# Patient Record
Sex: FEMALE | Race: WHITE | NOT HISPANIC OR LATINO
[De-identification: names, ages, dates, MRNs, and addresses within clinical notes are randomized per-mention and may not be internally consistent; named-entity substitution may affect disease eponyms.]

---

## 2017-02-15 ENCOUNTER — OTHER (OUTPATIENT)
Age: 37
End: 2017-02-15

## 2017-02-16 ENCOUNTER — APPOINTMENT (OUTPATIENT)
Dept: OBGYN | Facility: CLINIC | Age: 37
End: 2017-02-16

## 2017-03-07 ENCOUNTER — APPOINTMENT (OUTPATIENT)
Dept: INTERNAL MEDICINE | Facility: CLINIC | Age: 37
End: 2017-03-07

## 2017-03-17 ENCOUNTER — APPOINTMENT (OUTPATIENT)
Dept: OBGYN | Facility: CLINIC | Age: 37
End: 2017-03-17

## 2017-07-20 ENCOUNTER — RESULT REVIEW (OUTPATIENT)
Age: 37
End: 2017-07-20

## 2018-04-10 ENCOUNTER — APPOINTMENT (OUTPATIENT)
Dept: ULTRASOUND IMAGING | Facility: CLINIC | Age: 38
End: 2018-04-10

## 2018-05-07 ENCOUNTER — APPOINTMENT (OUTPATIENT)
Dept: ULTRASOUND IMAGING | Facility: CLINIC | Age: 38
End: 2018-05-07
Payer: COMMERCIAL

## 2018-05-07 ENCOUNTER — OUTPATIENT (OUTPATIENT)
Dept: OUTPATIENT SERVICES | Facility: HOSPITAL | Age: 38
LOS: 1 days | End: 2018-05-07
Payer: COMMERCIAL

## 2018-05-07 DIAGNOSIS — Z00.8 ENCOUNTER FOR OTHER GENERAL EXAMINATION: ICD-10-CM

## 2018-05-07 PROCEDURE — 76641 ULTRASOUND BREAST COMPLETE: CPT | Mod: 26,50

## 2018-05-07 PROCEDURE — 76641 ULTRASOUND BREAST COMPLETE: CPT

## 2019-11-06 ENCOUNTER — TRANSCRIPTION ENCOUNTER (OUTPATIENT)
Age: 39
End: 2019-11-06

## 2019-11-10 ENCOUNTER — EMERGENCY (EMERGENCY)
Facility: HOSPITAL | Age: 39
LOS: 1 days | Discharge: HOME | End: 2019-11-10
Attending: EMERGENCY MEDICINE | Admitting: EMERGENCY MEDICINE
Payer: COMMERCIAL

## 2019-11-10 VITALS
RESPIRATION RATE: 18 BRPM | SYSTOLIC BLOOD PRESSURE: 126 MMHG | TEMPERATURE: 98 F | OXYGEN SATURATION: 98 % | HEIGHT: 61 IN | HEART RATE: 112 BPM | DIASTOLIC BLOOD PRESSURE: 87 MMHG | WEIGHT: 115.08 LBS

## 2019-11-10 VITALS — DIASTOLIC BLOOD PRESSURE: 70 MMHG | TEMPERATURE: 98 F | SYSTOLIC BLOOD PRESSURE: 122 MMHG | HEART RATE: 82 BPM

## 2019-11-10 LAB
ALBUMIN SERPL ELPH-MCNC: 3.7 G/DL — SIGNIFICANT CHANGE UP (ref 3.3–5)
ALP SERPL-CCNC: 44 U/L — SIGNIFICANT CHANGE UP (ref 30–120)
ALT FLD-CCNC: 21 U/L DA — SIGNIFICANT CHANGE UP (ref 10–60)
ANION GAP SERPL CALC-SCNC: 7 MMOL/L — SIGNIFICANT CHANGE UP (ref 5–17)
APPEARANCE UR: ABNORMAL
AST SERPL-CCNC: 14 U/L — SIGNIFICANT CHANGE UP (ref 10–40)
BASOPHILS # BLD AUTO: 0 K/UL — SIGNIFICANT CHANGE UP (ref 0–0.2)
BASOPHILS NFR BLD AUTO: 0 % — SIGNIFICANT CHANGE UP (ref 0–2)
BILIRUB SERPL-MCNC: 0.3 MG/DL — SIGNIFICANT CHANGE UP (ref 0.2–1.2)
BILIRUB UR-MCNC: NEGATIVE — SIGNIFICANT CHANGE UP
BUN SERPL-MCNC: 6 MG/DL — LOW (ref 7–23)
CALCIUM SERPL-MCNC: 9.8 MG/DL — SIGNIFICANT CHANGE UP (ref 8.4–10.5)
CHLORIDE SERPL-SCNC: 101 MMOL/L — SIGNIFICANT CHANGE UP (ref 96–108)
CO2 SERPL-SCNC: 28 MMOL/L — SIGNIFICANT CHANGE UP (ref 22–31)
COLOR SPEC: SIGNIFICANT CHANGE UP
CREAT SERPL-MCNC: 0.76 MG/DL — SIGNIFICANT CHANGE UP (ref 0.5–1.3)
DIFF PNL FLD: ABNORMAL
EOSINOPHIL # BLD AUTO: 0.03 K/UL — SIGNIFICANT CHANGE UP (ref 0–0.5)
EOSINOPHIL NFR BLD AUTO: 0.7 % — SIGNIFICANT CHANGE UP (ref 0–6)
GLUCOSE SERPL-MCNC: 158 MG/DL — HIGH (ref 70–99)
GLUCOSE UR QL: NEGATIVE MG/DL — SIGNIFICANT CHANGE UP
HCG SERPL-ACNC: 1 MIU/ML — SIGNIFICANT CHANGE UP
HCT VFR BLD CALC: 41.2 % — SIGNIFICANT CHANGE UP (ref 34.5–45)
HGB BLD-MCNC: 13.7 G/DL — SIGNIFICANT CHANGE UP (ref 11.5–15.5)
IMM GRANULOCYTES NFR BLD AUTO: 0.5 % — SIGNIFICANT CHANGE UP (ref 0–1.5)
KETONES UR-MCNC: NEGATIVE — SIGNIFICANT CHANGE UP
LEUKOCYTE ESTERASE UR-ACNC: NEGATIVE — SIGNIFICANT CHANGE UP
LIDOCAIN IGE QN: 110 U/L — SIGNIFICANT CHANGE UP (ref 73–393)
LYMPHOCYTES # BLD AUTO: 0.58 K/UL — LOW (ref 1–3.3)
LYMPHOCYTES # BLD AUTO: 14 % — SIGNIFICANT CHANGE UP (ref 13–44)
MCHC RBC-ENTMCNC: 31 PG — SIGNIFICANT CHANGE UP (ref 27–34)
MCHC RBC-ENTMCNC: 33.3 GM/DL — SIGNIFICANT CHANGE UP (ref 32–36)
MCV RBC AUTO: 93.2 FL — SIGNIFICANT CHANGE UP (ref 80–100)
MONOCYTES # BLD AUTO: 0.25 K/UL — SIGNIFICANT CHANGE UP (ref 0–0.9)
MONOCYTES NFR BLD AUTO: 6 % — SIGNIFICANT CHANGE UP (ref 2–14)
NEUTROPHILS # BLD AUTO: 3.27 K/UL — SIGNIFICANT CHANGE UP (ref 1.8–7.4)
NEUTROPHILS NFR BLD AUTO: 78.8 % — HIGH (ref 43–77)
NITRITE UR-MCNC: NEGATIVE — SIGNIFICANT CHANGE UP
NRBC # BLD: 0 /100 WBCS — SIGNIFICANT CHANGE UP (ref 0–0)
PH UR: 7 — SIGNIFICANT CHANGE UP (ref 5–8)
PLATELET # BLD AUTO: 178 K/UL — SIGNIFICANT CHANGE UP (ref 150–400)
POTASSIUM SERPL-MCNC: 3.5 MMOL/L — SIGNIFICANT CHANGE UP (ref 3.5–5.3)
POTASSIUM SERPL-SCNC: 3.5 MMOL/L — SIGNIFICANT CHANGE UP (ref 3.5–5.3)
PROT SERPL-MCNC: 7.8 G/DL — SIGNIFICANT CHANGE UP (ref 6–8.3)
PROT UR-MCNC: NEGATIVE MG/DL — SIGNIFICANT CHANGE UP
RBC # BLD: 4.42 M/UL — SIGNIFICANT CHANGE UP (ref 3.8–5.2)
RBC # FLD: 11.8 % — SIGNIFICANT CHANGE UP (ref 10.3–14.5)
SODIUM SERPL-SCNC: 136 MMOL/L — SIGNIFICANT CHANGE UP (ref 135–145)
SP GR SPEC: 1.01 — SIGNIFICANT CHANGE UP (ref 1.01–1.02)
UROBILINOGEN FLD QL: NEGATIVE MG/DL — SIGNIFICANT CHANGE UP
WBC # BLD: 4.15 K/UL — SIGNIFICANT CHANGE UP (ref 3.8–10.5)
WBC # FLD AUTO: 4.15 K/UL — SIGNIFICANT CHANGE UP (ref 3.8–10.5)

## 2019-11-10 PROCEDURE — 36415 COLL VENOUS BLD VENIPUNCTURE: CPT

## 2019-11-10 PROCEDURE — 84702 CHORIONIC GONADOTROPIN TEST: CPT

## 2019-11-10 PROCEDURE — 71046 X-RAY EXAM CHEST 2 VIEWS: CPT

## 2019-11-10 PROCEDURE — 96361 HYDRATE IV INFUSION ADD-ON: CPT

## 2019-11-10 PROCEDURE — 99284 EMERGENCY DEPT VISIT MOD MDM: CPT | Mod: 25

## 2019-11-10 PROCEDURE — 83690 ASSAY OF LIPASE: CPT

## 2019-11-10 PROCEDURE — 87086 URINE CULTURE/COLONY COUNT: CPT

## 2019-11-10 PROCEDURE — 85027 COMPLETE CBC AUTOMATED: CPT

## 2019-11-10 PROCEDURE — 99284 EMERGENCY DEPT VISIT MOD MDM: CPT

## 2019-11-10 PROCEDURE — 71046 X-RAY EXAM CHEST 2 VIEWS: CPT | Mod: 26

## 2019-11-10 PROCEDURE — 96360 HYDRATION IV INFUSION INIT: CPT

## 2019-11-10 PROCEDURE — 80053 COMPREHEN METABOLIC PANEL: CPT

## 2019-11-10 PROCEDURE — 81001 URINALYSIS AUTO W/SCOPE: CPT

## 2019-11-10 RX ORDER — CLARITHROMYCIN 500 MG
0 TABLET ORAL
Qty: 0 | Refills: 0 | DISCHARGE

## 2019-11-10 RX ORDER — ASCORBIC ACID 60 MG
1 TABLET,CHEWABLE ORAL
Qty: 0 | Refills: 0 | DISCHARGE

## 2019-11-10 RX ORDER — GUAIFENESIN/PSEUDOEPHEDRNE HCL 1200-120MG
1 TABLET, EXTENDED RELEASE 12 HR ORAL
Qty: 10 | Refills: 0
Start: 2019-11-10 | End: 2019-11-14

## 2019-11-10 RX ORDER — SODIUM CHLORIDE 9 MG/ML
1000 INJECTION INTRAMUSCULAR; INTRAVENOUS; SUBCUTANEOUS ONCE
Refills: 0 | Status: COMPLETED | OUTPATIENT
Start: 2019-11-10 | End: 2019-11-10

## 2019-11-10 RX ORDER — L.ACIDOPH/B.ANIMALIS/B.LONGUM 15B CELL
1 CAPSULE ORAL
Qty: 0 | Refills: 0 | DISCHARGE

## 2019-11-10 RX ORDER — SODIUM CHLORIDE 9 MG/ML
3 INJECTION INTRAMUSCULAR; INTRAVENOUS; SUBCUTANEOUS ONCE
Refills: 0 | Status: COMPLETED | OUTPATIENT
Start: 2019-11-10 | End: 2019-11-10

## 2019-11-10 RX ADMIN — SODIUM CHLORIDE 1000 MILLILITER(S): 9 INJECTION INTRAMUSCULAR; INTRAVENOUS; SUBCUTANEOUS at 11:35

## 2019-11-10 RX ADMIN — SODIUM CHLORIDE 1000 MILLILITER(S): 9 INJECTION INTRAMUSCULAR; INTRAVENOUS; SUBCUTANEOUS at 10:20

## 2019-11-10 RX ADMIN — SODIUM CHLORIDE 3 MILLILITER(S): 9 INJECTION INTRAMUSCULAR; INTRAVENOUS; SUBCUTANEOUS at 09:20

## 2019-11-10 RX ADMIN — SODIUM CHLORIDE 1000 MILLILITER(S): 9 INJECTION INTRAMUSCULAR; INTRAVENOUS; SUBCUTANEOUS at 09:20

## 2019-11-10 RX ADMIN — SODIUM CHLORIDE 1000 MILLILITER(S): 9 INJECTION INTRAMUSCULAR; INTRAVENOUS; SUBCUTANEOUS at 10:30

## 2019-11-10 NOTE — ED PROVIDER NOTE - NSFOLLOWUPINSTRUCTIONS_ED_ALL_ED_FT
Return to the ED for any new or worsening symptoms  Take your medication as prescribed  Stop the Z-Pack   Begin Augmentin 1 tab 2 times a day   Begin Mucinex per label instructions   Advance activity as tolerated  Follow up with your PMD in 2-3 days for a recheck   Tylenol and Motrin per label instructions as needed for pan and fever   Make sure to remain hydrated

## 2019-11-10 NOTE — ED ADULT NURSE NOTE - CHPI ED NUR SYMPTOMS NEG
no chest pain/no wheezing/no body aches/no diaphoresis/no chills/no headache/no shortness of breath/no edema/no hemoptysis

## 2019-11-10 NOTE — ED ADULT TRIAGE NOTE - CHIEF COMPLAINT QUOTE
" I have Pneumonia, has been on ZPack since Thursday prescribed by Urgent Care, I'm not feeling better "

## 2019-11-10 NOTE — ED PROVIDER NOTE - PATIENT PORTAL LINK FT
You can access the FollowMyHealth Patient Portal offered by [facility] by registering at the following website: [email or web address]. By joining [employer]’s FollowMyHealth portal, you will also be able to view your health information using other applications (apps) compatible with our system.

## 2019-11-10 NOTE — ED PROVIDER NOTE - PROGRESS NOTE DETAILS
pt resting comfortably improved after hydration.  Results provided, all questions answered.  Stable for discharge home with outpatient follow up

## 2019-11-10 NOTE — ED PROVIDER NOTE - CLINICAL SUMMARY MEDICAL DECISION MAKING FREE TEXT BOX
Pt is a 39 yo female who presents to the ED with a cc of cough.  Pt with no significant past medical history.  Reports that on Wednesday she developed fevers with associated chills, diffuse myalgias and a non productive cough.  She was seen at urgent care on Thursday and was diagnosed with pneumonia.  Pt was treated with Zithromax and today would be day 4 of treatment.  She further reports that she had a rapid flu done at that time which was negative.  She reports that despite taking the medication she has had continued fevers, chills, and has now developed some mild nausea.  She spoke with her PMD and was told to come to the ED for further work up.  Denies HA, visual changes, nasal congestion, V/D/C, CP, SOB, abd pain.  Pt reports that when she takes Tylenol her fevers do resolve but they again return.  Pt last took Tylenol this am prior to arrival.  Pt with concern for untreated pneumonia vs viral illness.  Will obtain screening labs, chest x-ray and hydrate.  Will monitor

## 2019-11-10 NOTE — ED PROVIDER NOTE - OBJECTIVE STATEMENT
Pt is a 37 yo female who presents to the ED with a cc of cough.  Pt with no significant past medical history.  Reports that on Wednesday she developed fevers with associated chills, diffuse myalgias and a non productive cough.  She was seen at urgent care on Thursday and was diagnosed with pneumonia.  Pt was treated with Zithromax and today would be day 4 of treatment.  She further reports that she had a rapid flu done at that time which was negative.  She reports that despite taking the medication she has had continued fevers, chills, and has now developed some mild nausea.  She spoke with her PMD and was told to come to the ED for further work up.  Denies HA, visual changes, nasal congestion, V/D/C, CP, SOB, abd pain.  Pt reports that when she takes Tylenol her fevers do resolve but they again return.  Pt last took Tylenol this am prior to arrival

## 2019-11-10 NOTE — ED PROVIDER NOTE - NEUROLOGICAL, MLM
Alert and oriented, no focal deficits, no motor or sensory deficits. FROM of neck with no meningeal signs

## 2019-11-11 LAB
CULTURE RESULTS: SIGNIFICANT CHANGE UP
SPECIMEN SOURCE: SIGNIFICANT CHANGE UP

## 2019-11-15 ENCOUNTER — APPOINTMENT (OUTPATIENT)
Dept: UROLOGY | Facility: CLINIC | Age: 39
End: 2019-11-15

## 2019-12-17 PROBLEM — J18.9 PNEUMONIA, UNSPECIFIED ORGANISM: Chronic | Status: ACTIVE | Noted: 2019-11-10

## 2019-12-19 ENCOUNTER — NON-APPOINTMENT (OUTPATIENT)
Age: 39
End: 2019-12-19

## 2019-12-19 ENCOUNTER — APPOINTMENT (OUTPATIENT)
Dept: OPHTHALMOLOGY | Facility: CLINIC | Age: 39
End: 2019-12-19
Payer: COMMERCIAL

## 2019-12-19 PROCEDURE — 92250 FUNDUS PHOTOGRAPHY W/I&R: CPT

## 2019-12-19 PROCEDURE — 92004 COMPRE OPH EXAM NEW PT 1/>: CPT

## 2019-12-26 ENCOUNTER — APPOINTMENT (OUTPATIENT)
Dept: CT IMAGING | Facility: CLINIC | Age: 39
End: 2019-12-26
Payer: COMMERCIAL

## 2019-12-26 ENCOUNTER — OUTPATIENT (OUTPATIENT)
Dept: OUTPATIENT SERVICES | Facility: HOSPITAL | Age: 39
LOS: 1 days | End: 2019-12-26
Payer: COMMERCIAL

## 2019-12-26 DIAGNOSIS — Z00.8 ENCOUNTER FOR OTHER GENERAL EXAMINATION: ICD-10-CM

## 2019-12-26 PROCEDURE — 71275 CT ANGIOGRAPHY CHEST: CPT | Mod: 26

## 2019-12-26 PROCEDURE — 71275 CT ANGIOGRAPHY CHEST: CPT

## 2020-01-13 ENCOUNTER — APPOINTMENT (OUTPATIENT)
Dept: UROLOGY | Facility: CLINIC | Age: 40
End: 2020-01-13
Payer: COMMERCIAL

## 2020-01-13 VITALS
DIASTOLIC BLOOD PRESSURE: 84 MMHG | HEART RATE: 92 BPM | BODY MASS INDEX: 21.71 KG/M2 | RESPIRATION RATE: 18 BRPM | SYSTOLIC BLOOD PRESSURE: 120 MMHG | WEIGHT: 115 LBS | HEIGHT: 61 IN

## 2020-01-13 PROCEDURE — 99204 OFFICE O/P NEW MOD 45 MIN: CPT

## 2020-01-13 NOTE — PHYSICAL EXAM
[General Appearance - In No Acute Distress] : no acute distress [General Appearance - Well Nourished] : well nourished [General Appearance - Well Developed] : well developed [Abdomen Soft] : soft [Exaggerated Use Of Accessory Muscles For Inspiration] : no accessory muscle use [Edema] : no peripheral edema [Abdomen Tenderness] : non-tender [Normal Station and Gait] : the gait and station were normal for the patient's age [Costovertebral Angle Tenderness] : no ~M costovertebral angle tenderness [No Focal Deficits] : no focal deficits [Skin Color & Pigmentation] : normal skin color and pigmentation [Supraclavicular Lymph Nodes Enlarged Bilaterally] : supraclavicular [Oriented To Time, Place, And Person] : oriented to person, place, and time [Cervical Lymph Nodes Enlarged Anterior Bilaterally] : anterior cervical

## 2020-01-13 NOTE — HISTORY OF PRESENT ILLNESS
[FreeTextEntry1] : 39 year old F with cc of recurrent UTI. Pt reports that she has had issues since becoming sexually active with recurrent infection. Had 6 infections last year. Does correlate with intercourse usually. Sx during an infection include dysuria and increased frequency. Appears to be culture proven. No febrile infections. Last on abx completed 3-4 week ago. Had seen  in the past. Had been on macrobid ppx in the past. \par \par Drinks water and tea or coffee. No holding pattern. At baseline, no urinary complaints. No issues with constipation. No dyspareunia. Has hx of stones x1 in her early 20s.  No recent imaging.

## 2020-01-13 NOTE — ASSESSMENT
[FreeTextEntry1] : --UA, UCx to ensure clearance\par --Cont encourage fluid intake and frequent voiding\par --Void after intercourse\par --Anatomic eval with renal US and cysto\par --Begin ppx with methenamine\par --Begin vit C\par --RTC for cysto

## 2020-01-16 LAB
APPEARANCE: CLEAR
BACTERIA UR CULT: ABNORMAL
BACTERIA: NEGATIVE
BILIRUBIN URINE: NEGATIVE
BLOOD URINE: NEGATIVE
COLOR: NORMAL
GLUCOSE QUALITATIVE U: NEGATIVE
HYALINE CASTS: 1 /LPF
KETONES URINE: NEGATIVE
LEUKOCYTE ESTERASE URINE: NEGATIVE
MICROSCOPIC-UA: NORMAL
NITRITE URINE: NEGATIVE
PH URINE: 6
PROTEIN URINE: NEGATIVE
RED BLOOD CELLS URINE: 4 /HPF
SPECIFIC GRAVITY URINE: 1.02
SQUAMOUS EPITHELIAL CELLS: 2 /HPF
UROBILINOGEN URINE: NORMAL
WHITE BLOOD CELLS URINE: 2 /HPF

## 2020-01-27 ENCOUNTER — TRANSCRIPTION ENCOUNTER (OUTPATIENT)
Age: 40
End: 2020-01-27

## 2020-01-27 ENCOUNTER — APPOINTMENT (OUTPATIENT)
Dept: OPHTHALMOLOGY | Facility: CLINIC | Age: 40
End: 2020-01-27

## 2020-01-29 ENCOUNTER — OUTPATIENT (OUTPATIENT)
Dept: OUTPATIENT SERVICES | Facility: HOSPITAL | Age: 40
LOS: 1 days | End: 2020-01-29
Payer: COMMERCIAL

## 2020-01-29 ENCOUNTER — APPOINTMENT (OUTPATIENT)
Dept: UROLOGY | Facility: CLINIC | Age: 40
End: 2020-01-29
Payer: COMMERCIAL

## 2020-01-29 VITALS
BODY MASS INDEX: 21.71 KG/M2 | HEIGHT: 61 IN | HEART RATE: 96 BPM | TEMPERATURE: 98.6 F | DIASTOLIC BLOOD PRESSURE: 85 MMHG | WEIGHT: 115 LBS | SYSTOLIC BLOOD PRESSURE: 132 MMHG

## 2020-01-29 DIAGNOSIS — R35.0 FREQUENCY OF MICTURITION: ICD-10-CM

## 2020-01-29 PROCEDURE — 52000 CYSTOURETHROSCOPY: CPT

## 2020-01-29 PROCEDURE — 99214 OFFICE O/P EST MOD 30 MIN: CPT | Mod: 25

## 2020-01-29 PROCEDURE — 76775 US EXAM ABDO BACK WALL LIM: CPT | Mod: 26

## 2020-01-29 PROCEDURE — 76775 US EXAM ABDO BACK WALL LIM: CPT

## 2020-01-30 NOTE — ASSESSMENT
[FreeTextEntry1] : Recurrent UTI\par --UA, UCx to ensure clearance\par --Cont encourage fluid intake and frequent voiding\par --Void after intercourse\par --Begin ppx with methenamine\par --Begin vit C (500mg BID)\par \par Stones\par --Increase fluid intake\par --Decrease caffeine\par --Renal US eval in 1y

## 2020-01-30 NOTE — PHYSICAL EXAM
[General Appearance - Well Developed] : well developed [General Appearance - Well Nourished] : well nourished [General Appearance - In No Acute Distress] : no acute distress [Abdomen Soft] : soft [Abdomen Tenderness] : non-tender [Costovertebral Angle Tenderness] : no ~M costovertebral angle tenderness [Skin Color & Pigmentation] : normal skin color and pigmentation [Edema] : no peripheral edema [Exaggerated Use Of Accessory Muscles For Inspiration] : no accessory muscle use [Oriented To Time, Place, And Person] : oriented to person, place, and time [Normal Station and Gait] : the gait and station were normal for the patient's age [No Focal Deficits] : no focal deficits [Urethral Meatus] : normal urethra [External Female Genitalia] : normal external genitalia [Urinary Bladder Findings] : the bladder was normal on palpation [Vagina] : normal vaginal exam

## 2020-01-30 NOTE — HISTORY OF PRESENT ILLNESS
[FreeTextEntry1] : 39 year old F with cc of recurrent UTI. Pt reports that she has had issues since becoming sexually active with recurrent infection. Had 6 infections last year. Does correlate with intercourse usually. Sx during an infection include dysuria and increased frequency. Appears to be culture proven. No febrile infections. Had seen  in the past. Had been on macrobid ppx in the past. She was started on methenamine but has not started taking. UA from initial visit was negative but UCx positive and given recent tx, plan made to treat to try and clear prior to initiating methenamine. While on abx with bactrim, pt with low grade fever and WBC low (1.8). Went to ER for eval. Suspected to be viral vs drug related. WBC now improving. \par \par Plan also made for anatomic eval. Cysto today negative. Renal US showed probable 3mm LLP stone. Calcification on R appears intraparenchymal. Pt reports hx of stones in the past x1 in her early 20s. Drinks water and tea or coffee. No holding pattern. At baseline, no urinary complaints. No issues with constipation. No dyspareunia.

## 2020-02-03 LAB
APPEARANCE: CLEAR
BACTERIA UR CULT: NORMAL
BACTERIA: NEGATIVE
BILIRUBIN URINE: NEGATIVE
BLOOD URINE: ABNORMAL
COLOR: COLORLESS
GLUCOSE QUALITATIVE U: NEGATIVE
HYALINE CASTS: 0 /LPF
KETONES URINE: NEGATIVE
LEUKOCYTE ESTERASE URINE: NEGATIVE
MICROSCOPIC-UA: NORMAL
NITRITE URINE: NEGATIVE
PH URINE: 7
PROTEIN URINE: NEGATIVE
RED BLOOD CELLS URINE: 1 /HPF
SPECIFIC GRAVITY URINE: 1
SQUAMOUS EPITHELIAL CELLS: 1 /HPF
UROBILINOGEN URINE: NORMAL
WHITE BLOOD CELLS URINE: 0 /HPF

## 2020-02-10 ENCOUNTER — RX CHANGE (OUTPATIENT)
Age: 40
End: 2020-02-10

## 2020-02-10 DIAGNOSIS — N20.0 CALCULUS OF KIDNEY: ICD-10-CM

## 2020-02-10 DIAGNOSIS — N39.0 URINARY TRACT INFECTION, SITE NOT SPECIFIED: ICD-10-CM

## 2020-02-13 RX ORDER — SULFAMETHOXAZOLE AND TRIMETHOPRIM 800; 160 MG/1; MG/1
800-160 TABLET ORAL
Qty: 14 | Refills: 0 | Status: DISCONTINUED | COMMUNITY
Start: 2020-01-16 | End: 2020-02-13

## 2020-03-02 ENCOUNTER — APPOINTMENT (OUTPATIENT)
Dept: ULTRASOUND IMAGING | Facility: IMAGING CENTER | Age: 40
End: 2020-03-02

## 2020-03-02 ENCOUNTER — APPOINTMENT (OUTPATIENT)
Dept: UROLOGY | Facility: CLINIC | Age: 40
End: 2020-03-02

## 2020-06-01 ENCOUNTER — APPOINTMENT (OUTPATIENT)
Dept: OPHTHALMOLOGY | Facility: CLINIC | Age: 40
End: 2020-06-01
Payer: COMMERCIAL

## 2020-06-01 ENCOUNTER — NON-APPOINTMENT (OUTPATIENT)
Age: 40
End: 2020-06-01

## 2020-06-01 PROCEDURE — 92012 INTRM OPH EXAM EST PATIENT: CPT

## 2020-06-01 PROCEDURE — 92133 CPTRZD OPH DX IMG PST SGM ON: CPT

## 2020-06-09 ENCOUNTER — APPOINTMENT (OUTPATIENT)
Dept: UROLOGY | Facility: CLINIC | Age: 40
End: 2020-06-09
Payer: COMMERCIAL

## 2020-06-09 PROCEDURE — 99213 OFFICE O/P EST LOW 20 MIN: CPT | Mod: 95

## 2020-06-09 NOTE — ASSESSMENT
[FreeTextEntry1] : Recurrent UTI. Has done very well with methenamine with no infections. She is anxious about stopping the medication. \par --Cont encourage fluid intake and frequent voiding\par --Void after intercourse\par --Cont methenamine but decrease to daily\par --COnt vit C (500mg now qd)\par \par Stones\par --Increase fluid intake\par --Decrease caffeine\par --Renal US eval in 6mo

## 2020-06-09 NOTE — PHYSICAL EXAM
[General Appearance - Well Developed] : well developed [General Appearance - Well Nourished] : well nourished [General Appearance - In No Acute Distress] : no acute distress [Abdomen Tenderness] : non-tender [Oriented To Time, Place, And Person] : oriented to person, place, and time [Normal Station and Gait] : the gait and station were normal for the patient's age

## 2020-06-09 NOTE — HISTORY OF PRESENT ILLNESS
[Other Location: e.g. Home (Enter Location, City,State)___] : at [unfilled] [FreeTextEntry1] : The patient-doctor relationship has been established in a face to face fashion via real time video/audio HIPAA compliant communication using telemedicine software. The patient's identity has been confirmed. The patient was previously emailed a copy of the telemedicine consent. They have had a chance to review and has now given verbal consent and has requested care to be assessed and treated through telemedicine and understands there maybe limitations in this process and they may need further follow up care in the office and or hospital settings.\par \par Verbal consent given to me on 06/09/2020 at 10:59 AM by Ms. ILDA NEALGONZALO [place] [address]. \par \par 39 year old F with cc of recurrent UTI. Pt reports that she has had issues since becoming sexually active with recurrent infection. Does correlate with intercourse usually. Sx during an infection include dysuria and increased frequency. Appears to be culture proven. No febrile infections. Had seen  in the past. Had been on macrobid ppx in the past. She was started on methenamine. Had anatomic eval 1/2020 with negative cysto and renal US showed probable 3mm LLP stone. Calcification on R appears intraparenchymal. Pt reports hx of stones in the past x1 in her early 20s. Drinks water and tea or coffee. No holding pattern. At baseline, no urinary complaints. No issues with constipation. No dyspareunia. \par \par She is seen today for follow-up. No infections since starting medications in Jan. She is extremely happy with this--longest she has gone without infection. She is anxious about stopping it. She denies issues with stones.

## 2020-08-10 ENCOUNTER — APPOINTMENT (OUTPATIENT)
Dept: PEDIATRIC ALLERGY IMMUNOLOGY | Facility: CLINIC | Age: 40
End: 2020-08-10

## 2020-08-27 ENCOUNTER — APPOINTMENT (OUTPATIENT)
Dept: UROLOGY | Facility: CLINIC | Age: 40
End: 2020-08-27
Payer: COMMERCIAL

## 2020-08-27 PROCEDURE — 99213 OFFICE O/P EST LOW 20 MIN: CPT

## 2020-08-27 NOTE — ASSESSMENT
[FreeTextEntry1] : Recurrent UTI. Has done very well with methenamine with no infections. She is anxious about stopping the medication. \par --Cont encourage fluid intake and frequent voiding\par --Void after intercourse\par --Cont methenamine at daily\par --COnt vit C (500mg now qd)\par --Complete keflex (change to TID)\par --Pyridium prn\par \par Stones\par --Increase fluid intake\par --Decrease caffeine\par --Renal US eval in [place]

## 2020-08-27 NOTE — PHYSICAL EXAM
[General Appearance - Well Developed] : well developed [General Appearance - Well Nourished] : well nourished [Abdomen Tenderness] : non-tender [Edema] : no peripheral edema [General Appearance - In No Acute Distress] : no acute distress [Oriented To Time, Place, And Person] : oriented to person, place, and time [Normal Station and Gait] : the gait and station were normal for the patient's age [Exaggerated Use Of Accessory Muscles For Inspiration] : no accessory muscle use [No Focal Deficits] : no focal deficits

## 2020-08-27 NOTE — HISTORY OF PRESENT ILLNESS
[FreeTextEntry1] : 39 year old F with cc of recurrent UTI. Pt reports that she has had issues since becoming sexually active with recurrent infection. Does correlate with intercourse usually. Sx during an infection include dysuria and increased frequency. Appears to be culture proven. No febrile infections. Had seen  in the past. Had been on macrobid ppx in the past. She was started on methenamine. Had anatomic eval 1/2020 with negative cysto and renal US showed probable 3mm LLP stone. Calcification on R appears intraparenchymal. Pt reports hx of stones in the past x1 in her early 20s. Drinks water and tea or coffee. No holding pattern. At baseline, no urinary complaints. No issues with constipation. No dyspareunia. \par \par She is seen today for follow-up. No infections since starting medications in Jan. She is extremely happy with this--longest she has gone without infection. She was anxious about stopping it and we decreased to daily. She denies issues with stones. Last weekend she developed acute change in urinary sx and went to urgent care. UCx with pansesnitive EColi. She is still taking kefelx and feeling better.

## 2020-12-08 ENCOUNTER — NON-APPOINTMENT (OUTPATIENT)
Age: 40
End: 2020-12-08

## 2020-12-08 ENCOUNTER — APPOINTMENT (OUTPATIENT)
Dept: OPHTHALMOLOGY | Facility: CLINIC | Age: 40
End: 2020-12-08
Payer: COMMERCIAL

## 2020-12-08 PROCEDURE — 99072 ADDL SUPL MATRL&STAF TM PHE: CPT

## 2020-12-08 PROCEDURE — 92014 COMPRE OPH EXAM EST PT 1/>: CPT

## 2020-12-08 PROCEDURE — 92083 EXTENDED VISUAL FIELD XM: CPT

## 2020-12-08 PROCEDURE — 76514 ECHO EXAM OF EYE THICKNESS: CPT

## 2021-02-04 ENCOUNTER — APPOINTMENT (OUTPATIENT)
Dept: UROLOGY | Facility: CLINIC | Age: 41
End: 2021-02-04
Payer: COMMERCIAL

## 2021-02-04 VITALS
BODY MASS INDEX: 21.71 KG/M2 | SYSTOLIC BLOOD PRESSURE: 131 MMHG | RESPIRATION RATE: 16 BRPM | HEIGHT: 61 IN | TEMPERATURE: 98.3 F | WEIGHT: 115 LBS | DIASTOLIC BLOOD PRESSURE: 83 MMHG | HEART RATE: 104 BPM | OXYGEN SATURATION: 98 %

## 2021-02-04 DIAGNOSIS — N20.0 CALCULUS OF KIDNEY: ICD-10-CM

## 2021-02-04 PROCEDURE — 99072 ADDL SUPL MATRL&STAF TM PHE: CPT

## 2021-02-04 PROCEDURE — 99213 OFFICE O/P EST LOW 20 MIN: CPT

## 2021-02-04 NOTE — ASSESSMENT
[FreeTextEntry1] : Recurrent UTI. Has done much better from infection standpoint. \par --Increase fluid intake. Goal of >1.5L of water per day\par --Cont methenamine at BID dosing\par --COnt vit C \par --Pyridium x3-4d for current sx while awaiting cx\par \par Stones\par --Increase fluid intake\par --Renal US eval in [place]

## 2021-02-04 NOTE — HISTORY OF PRESENT ILLNESS
[FreeTextEntry1] : 40 year old F with cc of recurrent UTI. Pt reports that she has had issues since becoming sexually active with recurrent infection. Does correlate with intercourse usually. Sx during an infection include dysuria and increased frequency. Appears to be culture proven. No febrile infections. Had seen  in the past. Had been on macrobid ppx in the past. She was started on methenamine. Had anatomic eval 1/2020 with negative cysto and renal US showed probable 3mm LLP stone. Calcification on R appears intraparenchymal. Pt reports hx of stones in the past x1 in her early 20s. Drinks water and tea or coffee. No holding pattern. At baseline, no urinary complaints. No issues with constipation. No dyspareunia. Pt last seen 6mo ago and had no infection and was decreased to daily. She had one infection on this regimen with a pansensitive EColi. \par \par She returns today for follow-up. She reports that she had sx of UTI last week. SHe took abx on her own for this with keflex. She is still feeling a little bother. She has been taking the BID dosing. She has had some issues with yeast infections at the time of the abx use. \par

## 2021-02-04 NOTE — PHYSICAL EXAM
[General Appearance - Well Developed] : well developed [General Appearance - Well Nourished] : well nourished [General Appearance - In No Acute Distress] : no acute distress [Abdomen Tenderness] : non-tender [Edema] : no peripheral edema [Exaggerated Use Of Accessory Muscles For Inspiration] : no accessory muscle use [Oriented To Time, Place, And Person] : oriented to person, place, and time [Normal Station and Gait] : the gait and station were normal for the patient's age [No Focal Deficits] : no focal deficits [Abdomen Soft] : soft [Costovertebral Angle Tenderness] : no ~M costovertebral angle tenderness

## 2021-02-08 LAB
APPEARANCE: CLEAR
BACTERIA UR CULT: NORMAL
BACTERIA: NEGATIVE
BILIRUBIN URINE: NEGATIVE
BLOOD URINE: NEGATIVE
COLOR: COLORLESS
GLUCOSE QUALITATIVE U: NEGATIVE
HYALINE CASTS: 0 /LPF
KETONES URINE: NEGATIVE
LEUKOCYTE ESTERASE URINE: NEGATIVE
MICROSCOPIC-UA: NORMAL
NITRITE URINE: NEGATIVE
PH URINE: 6.5
PROTEIN URINE: NEGATIVE
RED BLOOD CELLS URINE: 0 /HPF
SPECIFIC GRAVITY URINE: 1.01
SQUAMOUS EPITHELIAL CELLS: 0 /HPF
UROBILINOGEN URINE: NORMAL
WHITE BLOOD CELLS URINE: 0 /HPF

## 2021-02-22 ENCOUNTER — APPOINTMENT (OUTPATIENT)
Dept: UROLOGY | Facility: CLINIC | Age: 41
End: 2021-02-22

## 2021-02-23 ENCOUNTER — NON-APPOINTMENT (OUTPATIENT)
Age: 41
End: 2021-02-23

## 2021-02-23 ENCOUNTER — TRANSCRIPTION ENCOUNTER (OUTPATIENT)
Age: 41
End: 2021-02-23

## 2021-03-25 ENCOUNTER — NON-APPOINTMENT (OUTPATIENT)
Age: 41
End: 2021-03-25

## 2021-04-05 ENCOUNTER — APPOINTMENT (OUTPATIENT)
Dept: UROLOGY | Facility: CLINIC | Age: 41
End: 2021-04-05
Payer: COMMERCIAL

## 2021-04-05 ENCOUNTER — OUTPATIENT (OUTPATIENT)
Dept: OUTPATIENT SERVICES | Facility: HOSPITAL | Age: 41
LOS: 1 days | End: 2021-04-05
Payer: COMMERCIAL

## 2021-04-05 DIAGNOSIS — R35.0 FREQUENCY OF MICTURITION: ICD-10-CM

## 2021-04-05 PROCEDURE — 76775 US EXAM ABDO BACK WALL LIM: CPT | Mod: 26

## 2021-04-05 PROCEDURE — 99072 ADDL SUPL MATRL&STAF TM PHE: CPT

## 2021-04-05 PROCEDURE — 76775 US EXAM ABDO BACK WALL LIM: CPT

## 2021-04-05 PROCEDURE — 99213 OFFICE O/P EST LOW 20 MIN: CPT

## 2021-04-05 NOTE — PHYSICAL EXAM
[General Appearance - Well Developed] : well developed [General Appearance - Well Nourished] : well nourished [General Appearance - In No Acute Distress] : no acute distress [Abdomen Soft] : soft [Abdomen Tenderness] : non-tender [Costovertebral Angle Tenderness] : no ~M costovertebral angle tenderness [Edema] : no peripheral edema [Exaggerated Use Of Accessory Muscles For Inspiration] : no accessory muscle use [Oriented To Time, Place, And Person] : oriented to person, place, and time [Normal Station and Gait] : the gait and station were normal for the patient's age [No Focal Deficits] : no focal deficits

## 2021-04-05 NOTE — ASSESSMENT
[FreeTextEntry1] : Recurrent UTI. Has done much better from infection standpoint. \par --Increase fluid intake. Goal of >1.5L of water per day\par --Cont methenamine at BID dosing\par --COnt vit C \par --RTC in 1y\par \par Stones\par --Increase fluid intake\par --Renal US in 1y

## 2021-04-05 NOTE — HISTORY OF PRESENT ILLNESS
[FreeTextEntry1] : 40 year old F with cc of recurrent UTI and stones. Pt reports that she has had issues since becoming sexually active with recurrent infection. Does correlate with intercourse usually. Sx during an infection include dysuria and increased frequency. Appears to be culture proven. No febrile infections. Had seen  in the past. Had been on macrobid ppx in the past. She was started on methenamine. Had anatomic eval 1/2020 with negative cysto and renal US showed probable 3mm LLP stone. Calcification on R appears intraparenchymal. Pt reports hx of stones in the past x1 in her early 20s. Drinks water and tea or coffee. No holding pattern. At baseline, no urinary complaints. No issues with constipation. No dyspareunia. She underwent US today that shows no changes. Unclear if RLP is stone vs calcification. \par \par She returns today for follow-up. Last infection was in Feb with pansensitive EColi. She has been feeling well since. She is anxious about stopping medication as it has been working for her. \par

## 2021-04-09 LAB
APPEARANCE: CLEAR
BACTERIA: NEGATIVE
BILIRUBIN URINE: NEGATIVE
BLOOD URINE: NEGATIVE
COLOR: COLORLESS
GLUCOSE QUALITATIVE U: NEGATIVE
HYALINE CASTS: 0 /LPF
KETONES URINE: NEGATIVE
LEUKOCYTE ESTERASE URINE: NEGATIVE
MICROSCOPIC-UA: NORMAL
NITRITE URINE: NEGATIVE
PH URINE: 6
PROTEIN URINE: NEGATIVE
RED BLOOD CELLS URINE: 1 /HPF
SPECIFIC GRAVITY URINE: 1.01
SQUAMOUS EPITHELIAL CELLS: 2 /HPF
UROBILINOGEN URINE: NORMAL
WHITE BLOOD CELLS URINE: 2 /HPF

## 2021-04-16 DIAGNOSIS — N39.0 URINARY TRACT INFECTION, SITE NOT SPECIFIED: ICD-10-CM

## 2021-04-16 DIAGNOSIS — N20.0 CALCULUS OF KIDNEY: ICD-10-CM

## 2021-10-10 ENCOUNTER — NON-APPOINTMENT (OUTPATIENT)
Age: 41
End: 2021-10-10

## 2021-12-13 ENCOUNTER — APPOINTMENT (OUTPATIENT)
Dept: UROLOGY | Facility: CLINIC | Age: 41
End: 2021-12-13

## 2022-01-11 ENCOUNTER — APPOINTMENT (OUTPATIENT)
Dept: OPHTHALMOLOGY | Facility: CLINIC | Age: 42
End: 2022-01-11
Payer: COMMERCIAL

## 2022-01-11 ENCOUNTER — NON-APPOINTMENT (OUTPATIENT)
Age: 42
End: 2022-01-11

## 2022-01-11 PROCEDURE — 92014 COMPRE OPH EXAM EST PT 1/>: CPT

## 2022-01-11 PROCEDURE — 92133 CPTRZD OPH DX IMG PST SGM ON: CPT

## 2022-01-14 ENCOUNTER — APPOINTMENT (OUTPATIENT)
Dept: UROLOGY | Facility: CLINIC | Age: 42
End: 2022-01-14
Payer: COMMERCIAL

## 2022-01-14 PROCEDURE — 99213 OFFICE O/P EST LOW 20 MIN: CPT

## 2022-01-14 RX ORDER — PHENAZOPYRIDINE HYDROCHLORIDE 200 MG/1
200 TABLET ORAL 3 TIMES DAILY
Qty: 21 | Refills: 0 | Status: COMPLETED | COMMUNITY
Start: 2021-02-04 | End: 2022-01-14

## 2022-01-14 RX ORDER — CEPHALEXIN 500 MG/1
500 CAPSULE ORAL 3 TIMES DAILY
Qty: 9 | Refills: 0 | Status: COMPLETED | COMMUNITY
Start: 2020-02-13 | End: 2022-01-14

## 2022-01-18 LAB
APPEARANCE: CLEAR
BACTERIA UR CULT: NORMAL
BACTERIA: NEGATIVE
BILIRUBIN URINE: NEGATIVE
BLOOD URINE: NEGATIVE
COLOR: NORMAL
GLUCOSE QUALITATIVE U: NEGATIVE
HYALINE CASTS: 0 /LPF
KETONES URINE: NEGATIVE
LEUKOCYTE ESTERASE URINE: NEGATIVE
MICROSCOPIC-UA: NORMAL
NITRITE URINE: NEGATIVE
PH URINE: 6
PROTEIN URINE: NEGATIVE
RED BLOOD CELLS URINE: 7 /HPF
SPECIFIC GRAVITY URINE: 1.02
SQUAMOUS EPITHELIAL CELLS: 6 /HPF
UROBILINOGEN URINE: NORMAL
WHITE BLOOD CELLS URINE: 3 /HPF

## 2022-01-18 NOTE — ASSESSMENT
[FreeTextEntry1] : 40 year old F with cc of recurrent UTI and kidney stones doing well with Methenamine Hippurate 1 gm PO BID  since 2020. Discussed continued doscing vs decrease vs cessation. Given difficulty with access to care during this pandemic, pt prefers to continue. \par --Cont methenaine BID\par \par Hx of stones. \par --renal US eval. will call with results\par \par \par RTO in 1 year. \par \par

## 2022-01-18 NOTE — HISTORY OF PRESENT ILLNESS
[FreeTextEntry1] : 40 year old F with cc of recurrent UTI and stones. Pt reports that she has had issues since becoming sexually active with recurrent infection. Does correlate with intercourse usually. Sx during an infection include dysuria and increased frequency. Appears to be culture proven. No febrile infections. Had seen  in the past. Had been on Macrobid ppx in the past. She was started on methenamine. Had anatomic eval 1/2020 with negative cysto and renal US showed probable 3 mm LLP stone. Calcification on R appears intraparenchymal. Pt reports hx of stones in the past x1 in her early 20s. Drinks water and tea or coffee. No holding pattern. At baseline, no urinary complaints. No issues with constipation. No dyspareunia. She underwent US today that shows no changes. Unclear if RLP is stone vs calcification. \par \par She returns today for follow-up. Last infection was in Feb with pansensitive E Coli. She has been feeling well since. She is anxious about stopping medication as it has been working for her. \par \par \par Today - last Renal US April 2021 bilateral  non obstructing stone 3.4 and 3.7 mm stone.no hydronephrosis, no mass.She wants to know if she can go off her UTI prophylactic Methenamine Hippurate , has been on it since 2020.She tried weaning off to 1 pill daily and had a UTI. Fluids : 5 - 6 glasses of water and 1 cup coffee. Denies constipation. Cysto in 2020 was WNL. \par

## 2022-01-18 NOTE — REVIEW OF SYSTEMS
[see HPI] : see HPI [Negative] : Heme/Lymph [Constipation] : no constipation [Dysuria] : no dysuria [Incontinence] : no incontinence

## 2022-01-29 ENCOUNTER — NON-APPOINTMENT (OUTPATIENT)
Age: 42
End: 2022-01-29

## 2022-02-04 ENCOUNTER — APPOINTMENT (OUTPATIENT)
Dept: UROLOGY | Facility: CLINIC | Age: 42
End: 2022-02-04
Payer: COMMERCIAL

## 2022-02-04 VITALS — DIASTOLIC BLOOD PRESSURE: 87 MMHG | SYSTOLIC BLOOD PRESSURE: 132 MMHG | OXYGEN SATURATION: 87 %

## 2022-02-04 PROCEDURE — 99214 OFFICE O/P EST MOD 30 MIN: CPT

## 2022-02-07 ENCOUNTER — TRANSCRIPTION ENCOUNTER (OUTPATIENT)
Age: 42
End: 2022-02-07

## 2022-02-08 NOTE — HISTORY OF PRESENT ILLNESS
[FreeTextEntry1] : 40 year old F with cc of recurrent UTI and stones. Pt reports that she has had issues since becoming sexually active with recurrent infection. Does correlate with intercourse usually. Sx during an infection include dysuria and increased frequency. Appears to be culture proven. No febrile infections. Had seen  in the past. Had been on Macrobid ppx in the past. She was started on methenamine. Had anatomic eval 1/2020 with negative cysto and renal US showed probable 3 mm LLP stone. Calcification on R appears intraparenchymal. Pt reports hx of stones in the past x1 in her early 20s. Drinks water and tea or coffee. No holding pattern. At baseline, no urinary complaints. No issues with constipation. No dyspareunia. She underwent US today that shows no changes. Unclear if RLP is stone vs calcification. \par \par She returns today for follow-up. Last infection was in Feb with pansensitive E Coli. She has been feeling well since. She is anxious about stopping medication as it has been working for her. Last Renal US April 2021 bilateral  non obstructing stone 3.4 and 3.7 mm stone.no hydronephrosis, no mass.Cysto in 2020 was WNL. Plan was made for continuation of methenamine indefinitely. She was just seen recently and urine was negative. Very shortly thereafter she developed acute sx while away in [place] with dysuria and increased frequecny. Called PCP and was placed on cefadroxil for 7d. SHe reports that since this happened, having some vague sx since with mild burning. This is not related to urination. Pain is very dull. No increased frequency or urgency. No true dysuria. Has had issues with dyspareunia in the past but not recently. No hx of TMJ. \par

## 2022-02-08 NOTE — PHYSICAL EXAM
[General Appearance - Well Developed] : well developed [General Appearance - Well Nourished] : well nourished [Normal Appearance] : normal appearance [Well Groomed] : well groomed [General Appearance - In No Acute Distress] : no acute distress [Abdomen Soft] : soft [Abdomen Tenderness] : non-tender [Costovertebral Angle Tenderness] : no ~M costovertebral angle tenderness [Urinary Bladder Findings] : the bladder was normal on palpation [Edema] : no peripheral edema [] : no respiratory distress [Respiration, Rhythm And Depth] : normal respiratory rhythm and effort [Exaggerated Use Of Accessory Muscles For Inspiration] : no accessory muscle use [Oriented To Time, Place, And Person] : oriented to person, place, and time [Affect] : the affect was normal [Mood] : the mood was normal [Not Anxious] : not anxious [Normal Station and Gait] : the gait and station were normal for the patient's age [No Focal Deficits] : no focal deficits [Urethral Meatus] : normal urethra [External Female Genitalia] : normal external genitalia [Vagina] : normal vaginal exam [FreeTextEntry1] : no pelvic floor or bladder tenderness

## 2022-02-08 NOTE — ASSESSMENT
[FreeTextEntry1] : 40 year old F with cc of recurrent UTI and kidney stones doing well with Methenamine Hippurate 1 gm PO BID  since 2020. recent probable infection. Suspect continued sx are irritative. May have a pelvic floor component as well. \par --Cont methenaine BID\par --UA, UCx\par --Pyridium x1 week\par --avoid strain\par --avoif constipation\par --warm baths prn\par \par Hx of stones. \par --renal US eval as planned\par \par \par \par \par

## 2022-02-10 ENCOUNTER — NON-APPOINTMENT (OUTPATIENT)
Age: 42
End: 2022-02-10

## 2022-02-10 LAB
APPEARANCE: CLEAR
BACTERIA UR CULT: NORMAL
BACTERIA: NEGATIVE
BILIRUBIN URINE: NEGATIVE
BLOOD URINE: NEGATIVE
COLOR: NORMAL
GLUCOSE QUALITATIVE U: NEGATIVE
HYALINE CASTS: 0 /LPF
KETONES URINE: NEGATIVE
LEUKOCYTE ESTERASE URINE: NEGATIVE
MICROSCOPIC-UA: NORMAL
NITRITE URINE: NEGATIVE
PH URINE: 6
PROTEIN URINE: NEGATIVE
RED BLOOD CELLS URINE: 7 /HPF
SPECIFIC GRAVITY URINE: 1.03
SQUAMOUS EPITHELIAL CELLS: 3 /HPF
UROBILINOGEN URINE: NORMAL
WHITE BLOOD CELLS URINE: 1 /HPF

## 2022-04-07 ENCOUNTER — NON-APPOINTMENT (OUTPATIENT)
Age: 42
End: 2022-04-07

## 2022-04-07 ENCOUNTER — APPOINTMENT (OUTPATIENT)
Dept: OPHTHALMOLOGY | Facility: CLINIC | Age: 42
End: 2022-04-07
Payer: COMMERCIAL

## 2022-04-07 PROCEDURE — 92083 EXTENDED VISUAL FIELD XM: CPT

## 2022-04-07 PROCEDURE — 92012 INTRM OPH EXAM EST PATIENT: CPT

## 2022-05-12 NOTE — ED ADULT NURSE NOTE - NS ED NURSE RECORD ANOTHER VITAL SIGN
Problem: Discharge Planning  Goal: Discharge to home or other facility with appropriate resources  Outcome: Progressing  Flowsheets (Taken 5/11/2022 2015)  Discharge to home or other facility with appropriate resources:   Identify barriers to discharge with patient and caregiver   Arrange for needed discharge resources and transportation as appropriate   Identify discharge learning needs (meds, wound care, etc)   Refer to discharge planning if patient needs post-hospital services based on physician order or complex needs related to functional status, cognitive ability or social support system     Problem: Safety - Adult  Goal: Free from fall injury  Outcome: Progressing     Problem: ABCDS Injury Assessment  Goal: Absence of physical injury  Outcome: Progressing     Problem: Skin/Tissue Integrity  Goal: Absence of new skin breakdown  Description: 1. Monitor for areas of redness and/or skin breakdown  2. Assess vascular access sites hourly  3. Every 4-6 hours minimum:  Change oxygen saturation probe site  4. Every 4-6 hours:  If on nasal continuous positive airway pressure, respiratory therapy assess nares and determine need for appliance change or resting period.   Outcome: Progressing     Problem: Chronic Conditions and Co-morbidities  Goal: Patient's chronic conditions and co-morbidity symptoms are monitored and maintained or improved  Outcome: Progressing  Flowsheets (Taken 5/11/2022 2015)  Care Plan - Patient's Chronic Conditions and Co-Morbidity Symptoms are Monitored and Maintained or Improved:   Monitor and assess patient's chronic conditions and comorbid symptoms for stability, deterioration, or improvement   Collaborate with multidisciplinary team to address chronic and comorbid conditions and prevent exacerbation or deterioration   Update acute care plan with appropriate goals if chronic or comorbid symptoms are exacerbated and prevent overall improvement and discharge     Problem: Nutrition Deficit:  Goal: Optimize nutritional status  Outcome: Progressing     Problem: Pain  Goal: Verbalizes/displays adequate comfort level or baseline comfort level  Outcome: Progressing  Flowsheets (Taken 5/11/2022 1900)  Verbalizes/displays adequate comfort level or baseline comfort level:   Encourage patient to monitor pain and request assistance   Assess pain using appropriate pain scale   Administer analgesics based on type and severity of pain and evaluate response   Implement non-pharmacological measures as appropriate and evaluate response   Consider cultural and social influences on pain and pain management   Notify Licensed Independent Practitioner if interventions unsuccessful or patient reports new pain Yes

## 2022-07-25 ENCOUNTER — NON-APPOINTMENT (OUTPATIENT)
Age: 42
End: 2022-07-25

## 2022-07-26 ENCOUNTER — APPOINTMENT (OUTPATIENT)
Dept: UROLOGY | Facility: CLINIC | Age: 42
End: 2022-07-26

## 2022-07-26 ENCOUNTER — OUTPATIENT (OUTPATIENT)
Dept: OUTPATIENT SERVICES | Facility: HOSPITAL | Age: 42
LOS: 1 days | End: 2022-07-26
Payer: COMMERCIAL

## 2022-07-26 DIAGNOSIS — R35.0 FREQUENCY OF MICTURITION: ICD-10-CM

## 2022-07-26 DIAGNOSIS — N20.0 CALCULUS OF KIDNEY: ICD-10-CM

## 2022-07-26 PROCEDURE — 76775 US EXAM ABDO BACK WALL LIM: CPT

## 2022-07-26 PROCEDURE — 76775 US EXAM ABDO BACK WALL LIM: CPT | Mod: 26

## 2022-10-06 ENCOUNTER — NON-APPOINTMENT (OUTPATIENT)
Age: 42
End: 2022-10-06

## 2022-12-01 NOTE — ED ADULT NURSE NOTE - CAS EDN DISCHARGE INTERVENTIONS
Blair is a 31 month old boy who presents for evaluation of hypertonia, feeding issues, jaw stiffness, drooling. Mother is present for evaluation. Family states that at 8-9 months he was in ICU for 4 days due to respiratory difficulties. Family states that since then he has been very resistant to taking medication. Family states that since then he has been very anxious and when he is forced to take a medication he will open his jaw or close his jaw without eating or drinking for up to 12 hours. Family states that he is very strong willed and he will not do anything that he doesn't want to do. Family states that he has had a recent swallow study which was normal. Family states that he had oral aversion to medication and he was admitted to the hospital in 10/2022 due to him being forced to take a medication and then not allowing anyone to feed him after that. Family states that he has a history of RSV and influenza. Family states that when he is tried to give medicine he will open or close his mouth for prolonged periods and he will not eat or drink anything. Family states that the last time he wouldn't open his mouth for 3 days due to him given GERD medication. Family states that he is advanced with his development. Family states that he does things his older brother does. Family states that he is counting to 37. Family states that he has no facial weakness. Family states that they have seen no atrophy of his muscles. Family states that he has no fasciculation's or twitching of his muscles. Family has seen no twitching or atrophy of his tongue. Family states that he is not getting fatigued easily. Family states that he is able to go up and down stairs on his own. Family states that he is able to run. Family states that he is able to keep up with other individuals his age. Family states that they have seen no drooping of his eyelids or asymmetry of his face. Family states that he has no difficulty getting up from a  seated position. Family states that he has no difficulty raising his hands over his head. Family states that he is walking well and he is not tripping or falling when walking or running. Family states that he is not dropping things from his hands and he uses both hands well. Family states that he does not have muscle pain and he is not having cramps of his muscles. Family states that he is not having any vision changes. Family states that he is not having double vision, no blurry vision, and no eye pain. Family states that he is not having any abnormal eye movements or weakness of his eyes. Family states that he is speaking well and he is not having any slurring of his speech. Family states that he is not drooling. Family states that he has had no color changes and no temperature changes in his skin. Family states that he has no myoclonic jerking during sleep or when he is awake. Family states that he is not moaning or snoring during sleep. Family states that he is not having staring or unresponsive episodes. Family has seen no rhythmic or twitching movements of his extremities or face when he is awake or when he is asleep. Family states that he has a good appetite. Family states that there is no family history of muscle weakness and there is no other neurologic history in the family. Family states that he was a full term baby and there were no difficulties with his pregnancy. Family states that he required no extended NICU stay and he was discharged home with mother. Family states that during prenatal testing there were no problems. He had no genetic testing or fetal MRI's performed during his pregnancy. Family states that there were no toxic exposures in utero. Family states that he had no problems during his birth. Family states that at the time of his birth he had no birth asphyxia, no stroke, no traumatic delivery, and no infection. Family states that he had no concerning conditions in the period immediately  following his birth. Family states that he had no infection, no toxic exposures (e.g., lead), and no brain injury or head trauma following birth. Family states that they were not told he had any chromosomal abnormalities or dysmorphic features. He had  screening sent and was normal. He had hearing testing performed which was normal in the hospital.    Family History  Family states that there is no family history of hypotonia/weakness and there is no other neurologic history in the family.     Birth History  Full term, no complications per mother     Surgical History   has no past surgical history on file.     Social History  Lives at home with parents and siblings  Smoke exposure: None  Recent travel: None  Sick contacts: No known sick contacts     Family History  No family history of asthma or recurrent respiratory illnesses.     Allergies  ALLERGIES:  Patient has no known allergies.    Visit Vitals  Wt 12.3 kg (27 lb 1.9 oz)       WBC (K/mcL)   Date Value   2022 11.2     RBC (mil/mcL)   Date Value   2022 4.53     HCT (%)   Date Value   2022 33.7 (L)     HGB (g/dL)   Date Value   2022 11.7     PLT (K/mcL)   Date Value   2022 443        TSH (mcUnits/mL)   Date Value   2022 2.500      T4, Free (ng/dL)   Date Value   2022 1.0        Sodium (mmol/L)   Date Value   2022 137      Potassium (mmol/L)   Date Value   2022 3.9     Glucose (mg/dL)   Date Value   2022 80      Calcium (mg/dL)   Date Value   2022 9.7        GOT/AST (Units/L)   Date Value   2022 26     GPT/ALT (Units/L)   Date Value   2022 21     No results found for: GGTP  Alkaline Phosphatase (Units/L)   Date Value   2022 248     Bilirubin, Total (mg/dL)   Date Value   2022 0.1 (L)        No results found for: IRON     No results found for: VITD25     Ferritin (ng/mL)   Date Value   2022 17     REVIEW OF SYSTEMS:   Neurological:   hypertonia, feeding issues,  jaw stiffness, drooling  Constitutional:   There has been no unexplained weight loss, fever, chills or lassitude.   Eyes:   There have been no recurrent or unexplained episodes of visual blurring, visual loss, diplopia or eye pain.   Ears, Nose, Throat, Mouth:   There have been no recurrent or unexplained episodes of tinnitus or vertigo. Hearing loss has not been evident. There has been no unexplained or persistent nasal discharge or loss of smell or taste. Pain on swallowing, coughing or dysphagia have not been experienced.   Cardiovascular:   There have been no episodes of palpitations, exertional chest pain or edema.   Respiratory:   There is no SOB, wheezing, hemoptysis or any prolonged and unexplained cough.   Gastrointestinal:   There is no excessive nausea/vomiting, diarrhea, constipation or abdominal bloating.   Genitourinary:   There is no dysuria, nocturia, difficulty with initiating urination or incontinence of urine.   Musculoskeletal:   There have been no prominent joint pain, swelling or joint deformities. No significant back or neck pain has developed.   Integumentary (skin and/or breast):   No prominent skin lesions have developed. There has been no significant pruritus or skin rash.   Endocrine:   There has been no excessive thirst, urination, weight loss or gain. No abnormal skin pigmentation has developed over the extremities or torso.   Hematological and Lymphatic:   There is no history or treatment for an anemia. There has not been any palpable adenopathy.   Allergies and Immunologic:   There is no history of any seasonal or environmental allergies. There is no clear predisposition to seasonal illnesses or colds and no recurrent infections.   Psychiatric or Psychological:   no abnormalities.      Physical Exam  Text Template:   HEENT: NCAT. Mucus membranes moist. No dysmorphic features  Resp: No flaring/retractions.  CV: Normal perfusion, no edema.  GI: Soft, nontender, no masses.  Skin: No  neurocutaneous markings.  MS: Normal range of motion. No contractures.    NEUROLOGIC EXAM:   Mental Status:   He was awake and alert. He was nonverbal for me. He was able to follow simple commands crossing the midline.      Neck:   Supple. No head flexion or extension weakness    Cranial Nerves:   Tongue protruded in the midline and showed no atrophy, tremor or fasciculations, Face symmetric, SCM/Trap strength 5/5 bilaterally. No ptosis. No head flexion or extension weakness. Normal eye closure bilaterally. Pupils were 3mm equal round and reactive to light. Ocular motility was full and there was no nystagmus evident. Strength of masticatory muscles was normal. Corneal reflexes were present (direct and consensual). Palatal movements and gag reflex were intact.      Motor Examination (bulk, tone, strength):   Normal muscle bulk throughout. No scapular winging. He had no proximal muscle weakness in LE/UE. No Gowers sign. He was sitting, standing, and walking on his own. He would withdraw to noxious stimuli in all extremities.     Muscle Stretch reflexes (MSR's):   Muscle stretch reflexes were symmetric and normoactive.     Sensory:   He would withdraw to noxious stimuli in all extremities.    Gait and Station:   Erect posture was normal.      Spine:   Normal range of motion. No tenderness on palpation.       Results/Data:  Laboratory studies drawn on 11/30/2022 showing grossly normal cbc, normal cmp, normal ck, normal ferritin, normal free t4, and normal tsh      Discussion/Summary    Impression:Raymond Pinto is a 31 month old boy who presents for evaluation of hypertonia, feeding issues, jaw stiffness, drooling.   1. He had a normal neurologic examination for age.   2. He is cleared from a neurologic perspective for surgery/procedures.   3. He had laboratory studies drawn on 11/30/2022 showing grossly normal cbc, normal cmp, normal ck, normal ferritin, normal free t4, and normal tsh.   4. He requires no further  neurologic testing at this time.   5. Family is to call in interim with any questions or concerns.   6. I went over all questions thoroughly and completely with family.     Justin Bender MD     Note: over 62 minutes of face to face time spent with the patient, with over 50% in consultation discussing diagnosis, treatment, and prognosis.        IV discontinued, cath removed intact

## 2022-12-07 ENCOUNTER — APPOINTMENT (OUTPATIENT)
Dept: PLASTIC SURGERY | Facility: CLINIC | Age: 42
End: 2022-12-07

## 2022-12-07 VITALS
BODY MASS INDEX: 21.34 KG/M2 | DIASTOLIC BLOOD PRESSURE: 89 MMHG | WEIGHT: 113 LBS | TEMPERATURE: 98.2 F | HEIGHT: 61 IN | OXYGEN SATURATION: 99 % | HEART RATE: 80 BPM | SYSTOLIC BLOOD PRESSURE: 136 MMHG

## 2022-12-07 PROCEDURE — 99213 OFFICE O/P EST LOW 20 MIN: CPT

## 2022-12-21 RX ORDER — NITROFURANTOIN (MONOHYDRATE/MACROCRYSTALS) 25; 75 MG/1; MG/1
100 CAPSULE ORAL
Qty: 10 | Refills: 0 | Status: DISCONTINUED | COMMUNITY
Start: 2022-07-25 | End: 2022-12-21

## 2022-12-21 RX ORDER — CEPHALEXIN 500 MG/1
500 TABLET ORAL 3 TIMES DAILY
Qty: 21 | Refills: 0 | Status: DISCONTINUED | COMMUNITY
Start: 2022-02-04 | End: 2022-12-21

## 2022-12-21 NOTE — CONSULT LETTER
[Dear  ___] : Dear  [unfilled], [Courtesy Letter:] : I had the pleasure of seeing your patient, [unfilled], in my office today. [Please see my note below.] : Please see my note below. [Sincerely,] : Sincerely, [FreeTextEntry3] : Susan M. Palleschi, MD, FACS\par Division of Breast Surgery\par Director, Breast Surgery\par [facility]\par [address] [place] [address]\par [address]\[place] [address]\par (Phone) [phone]\par (Fax) [phone]

## 2022-12-21 NOTE — PHYSICAL EXAM

## 2022-12-21 NOTE — HISTORY OF PRESENT ILLNESS
[FreeTextEntry1] : She has a history of bilateral silicone breast augmentation, with the initial implant placement approximately 10 years ago with a subsequent implant exchange.\par No family history of breast cancer \par SHARON breast cancer estimated risk 10.2%\par 3/16/2022 Bilateral breast MRI: Stable probably benign enhancing left breast masses. MRI follow up in 1 year is advised to document 2 year stability. Advised 9/2022 annual mammogram/sonogram. BI-RADS 3. \par \par 9/17/2022 Bilateral mammogram: There are bilateral retropectoral silicone breast implants which appear mammographically unremarkable. No suspicious masses, grouped calcifications, or other findings are seen. As per the prior breast ultrasound report of 3/16/2022, the patient is currently due for a bilateral diagnostic breast ultrasound with attention to the left 2:00 axis, 9 cm from the nipple, at the site of a probably benign 0.3 cm mass.\par  An additional imaging exam of both breast(s) is recommended. BI-RADS 0.\par \par 10/21/2022 Bilateral breast MRI (follow up for probably benign masses left breast on prior MRI): \par Right breast- No suspicious enhancing masses or areas of ductal enhancement are seen in the right breast.\par Left breast- In the left upper outer breast, posterior depth, superior to the implant, there is a new 2.8 cm focal, clumped nonmass enhancement, at 1:00, 10 cm from the nipple. It is new since last MRI on 3/16/2022. It demonstrates type I kinetics on postcontrast imaging. Recommend a MRI guided core biopsy of the left breast.\par - Previously seen 9 mm superficial mass in the left upper inner breast, anterior depth has been stable since March 2021 MRI and can be considered benign. It is best seen on axial subtraction image 131.\par - Previously seen 8 mm mass in the left upper outer breast, middle depth, has been stable from 3/24/2021 MRI and can be considered benign. This is best seen on axial subtraction image 124.\par There is no abnormal axillary or internal mammary adenopathy. BI-RADS 4. \par \par 10/21/2022 Bilateral breast ultrasound: Probably benign findings in the left breast at the 2:00 axis, 9 cm from the nipple (stable since 4/2/2021), and in the left breast at the 4:00 axis, 1 cm from the nipple measuring 3 x 2 x 2 mm, likely a benign cyst. A six-month follow-up left targeted breast ultrasound is recommended to confirm stability.\par No sonographic evidence of malignancy in the right breast.\par A 6 month follow-up targeted ultrasound of the left breast(s) is recommended. BI-RADS 3. \par \par 11/9/2022 MRI guided left breast biopsy: Cancelled MRI guided core biopsy of left breast due to lack of safe access and high risk of implant rupture. Per the radiology report, she is 41 years old, with no known risk factors for breast cancer. Given the benign appearance of the lesion today, recommend a follow up breast MRI in 6 months to demonstrate stability. \par \par She reports bilateral breast tenderness with her periods\par No change in health history since last office visit\par Up to date with MDs

## 2022-12-21 NOTE — ASSESSMENT
[FreeTextEntry1] : Stable nodule on left ultrasound\par On MRI there is left upper outer breast, posterior depth, superior to the implant, new 2.8 cm focal, clumped nonmass enhancement, at 1:00, 10 cm from the nipple. Due to lack of safe access and high risk of implant rupture MRI biopsy unable to be performed.\par Clinical breast exam normal\par \par 1. Annual bilateral mammogram and breast ultrasound due 9/2023\par 2. Follow up office visit due 5-6/2023\par 3. Advised monthly self breast examinations and advised her to contact me if she has any concerns.\par 4. Bilateral breast MRI (6 month follow up) due 4/2023\par 5. Left breast ultrasound (6 month follow up) due 3/2023\par 6. I will forward her imaging CDs to [facility] radiology for review

## 2022-12-22 ENCOUNTER — TRANSCRIPTION ENCOUNTER (OUTPATIENT)
Age: 42
End: 2022-12-22

## 2023-01-19 ENCOUNTER — NON-APPOINTMENT (OUTPATIENT)
Age: 43
End: 2023-01-19

## 2023-01-25 ENCOUNTER — APPOINTMENT (OUTPATIENT)
Dept: UROLOGY | Facility: CLINIC | Age: 43
End: 2023-01-25
Payer: COMMERCIAL

## 2023-01-25 VITALS
RESPIRATION RATE: 16 BRPM | DIASTOLIC BLOOD PRESSURE: 89 MMHG | TEMPERATURE: 97.5 F | OXYGEN SATURATION: 100 % | SYSTOLIC BLOOD PRESSURE: 130 MMHG | HEART RATE: 78 BPM

## 2023-01-25 PROCEDURE — 99213 OFFICE O/P EST LOW 20 MIN: CPT

## 2023-01-30 NOTE — ASSESSMENT
[FreeTextEntry1] : Recurrent UTI and kidney stones doing well with Methenamine Hippurate 1 gm PO BID  since 2020. Intermittent infection. May have a pelvic floor component as well. \par --Cont methenaine BID\par --avoid strain\par --avoif constipation\par --warm baths prn\par \par Hx of stones. \par --renal US in 1y\par \par \par \par \par

## 2023-01-30 NOTE — HISTORY OF PRESENT ILLNESS
[FreeTextEntry1] : 42 year old F with cc of recurrent UTI and stones. Pt reports that she has had issues since becoming sexually active with recurrent infection. Does correlate with intercourse usually. Sx during an infection include dysuria and increased frequency. Appears to be culture proven. No febrile infections. Had seen  in the past. Had been on Macrobid ppx in the past. She was started on methenamine. Had anatomic eval 1/2020 with negative cysto and renal US showed probable 3 mm LLP stone. Calcification on R appears intraparenchymal. Pt reports hx of stones in the past x1 in her early 20s. Drinks water and tea or coffee. No holding pattern. At baseline, no urinary complaints. No issues with constipation. No dyspareunia. She underwent US in July (with Dr Jimenez) that showed no changes. Unclear if RLP is stone vs calcification. \par \par She returns today for follow-up. Last seen by me a year ago. Plan was made for continuation of methenamine indefinitely. She was just seen recently and urine was negative. No increased frequency or urgency. No true dysuria. Has had issues with dyspareunia in the past but not recently. No hx of TMJ. Doing much better with the methenamine. \par

## 2023-05-05 ENCOUNTER — APPOINTMENT (OUTPATIENT)
Dept: PLASTIC SURGERY | Facility: CLINIC | Age: 43
End: 2023-05-05
Payer: COMMERCIAL

## 2023-05-05 VITALS
DIASTOLIC BLOOD PRESSURE: 86 MMHG | OXYGEN SATURATION: 99 % | TEMPERATURE: 98.2 F | SYSTOLIC BLOOD PRESSURE: 125 MMHG | WEIGHT: 114 LBS | BODY MASS INDEX: 21.52 KG/M2 | HEIGHT: 61 IN | HEART RATE: 80 BPM

## 2023-05-05 DIAGNOSIS — R92.2 INCONCLUSIVE MAMMOGRAM: ICD-10-CM

## 2023-05-05 PROCEDURE — 99213 OFFICE O/P EST LOW 20 MIN: CPT

## 2023-05-05 NOTE — CONSULT LETTER
[Dear  ___] : Dear  [unfilled], [Courtesy Letter:] : I had the pleasure of seeing your patient, [unfilled], in my office today. [Please see my note below.] : Please see my note below. [Sincerely,] : Sincerely, [FreeTextEntry3] : Susan M. Palleschi, MD, FACS\par Division of Breast Surgery\par Director, Breast Surgery\par [facility]\par [address] [place] [address]\par [address]\[place] [address]\par (Phone) [phone]\par (Fax) [phone]

## 2023-05-05 NOTE — PHYSICAL EXAM

## 2023-05-05 NOTE — ASSESSMENT
[FreeTextEntry1] : Stable nodule on left ultrasound\par Clinical breast exam normal\par \par 1. Annual bilateral mammogram and breast ultrasound due 9/2023\par 2. Follow up office visit due in 1 year\par 3. Advised monthly self breast examinations and advised her to contact me if she has any concerns.\par 4. Bilateral breast MRI due 3/2024 (1 year follow up)\par \par Patient seen and examined with my PA Yane Chapman present

## 2023-05-05 NOTE — HISTORY OF PRESENT ILLNESS
[FreeTextEntry1] : Patient is a 42 year old female here today for a follow up visit. \par She has a history of bilateral silicone breast augmentation, with the initial implant placement approximately 10 years ago with a subsequent implant exchange.\par No family history of breast cancer \par SHARON breast cancer estimated risk 10.2%\par 9/17/2022 Bilateral mammogram: There are bilateral retropectoral silicone breast implants which appear mammographically unremarkable. No suspicious masses, grouped calcifications, or other findings are seen. As per the prior breast ultrasound report of 3/16/2022, the patient is currently due for a bilateral diagnostic breast ultrasound with attention to the left 2:00 axis, 9 cm from the nipple, at the site of a probably benign 0.3 cm mass.\par  An additional imaging exam of both breast(s) is recommended. BI-RADS 0.\par 10/21/2022 Bilateral breast ultrasound: Probably benign findings in the left breast at the 2:00 axis, 9 cm from the nipple (stable since 4/2/2021), and in the left breast at the 4:00 axis, 1 cm from the nipple measuring 3 x 2 x 2 mm, likely a benign cyst. A six-month follow-up left targeted breast ultrasound is recommended to confirm stability.\par No sonographic evidence of malignancy in the right breast.\par A 6 month follow-up targeted ultrasound of the left breast(s) is recommended. BI-RADS 3. \par 10/21/2022 Bilateral breast MRI (follow up for probably benign masses left breast on prior MRI): \par Right breast- No suspicious enhancing masses or areas of ductal enhancement are seen in the right breast.\par Left breast- In the left upper outer breast, posterior depth, superior to the implant, there is a new 2.8 cm focal, clumped nonmass enhancement, at 1:00, 10 cm from the nipple. It is new since last MRI on 3/16/2022. It demonstrates type I kinetics on postcontrast imaging. Recommend a MRI guided core biopsy of the left breast.\par - Previously seen 9 mm superficial mass in the left upper inner breast, anterior depth has been stable since March 2021 MRI and can be considered benign. It is best seen on axial subtraction image 131.\par - Previously seen 8 mm mass in the left upper outer breast, middle depth, has been stable from 3/24/2021 MRI and can be considered benign. This is best seen on axial subtraction image 124.\par There is no abnormal axillary or internal mammary adenopathy. BI-RADS 4. \par 11/9/2022 Cancelled MRI guided core biopsy of left breast due to lack of safe access and high risk of implant rupture. Per the radiology report, she is 41 years old, with no known risk factors for breast cancer. Given the benign appearance of the lesion today, recommend a follow up breast MRI in 6 months to demonstrate stability.\par 3/28/2023 Left ultrasound: Hypoechoic mass versus clustered cysts with debris 2:00 9 cm from the nipple 3 x 3 x 2 mm, smaller than seen in 2021.\par Adjacent cysts 2:00 9 cm from the nipple 3 x 3 x 2 mm.\par Visualized portions of the implant appear intact. A 6 month follow-up ultrasound of the left breast(s) is recommended. BI-RADS 3 \par 3/29/2023 Bilateral MRI: Right: No suspicious enhancing masses or areas of ductal enhancement are seen in the right breast.\par Left: Previously seen focal clumped nonmass enhancement in the left upper outer breast, posterior depth, at 1:00, 10 cm from the nipple appears less prominent than 10/21/2022 MRI. It appears similar to the background parenchymal enhancement and remains probably benign. It is best seen on axial subtraction image 136. Recommend continue MRI surveillance to demonstrate long-term stability.\par No suspicious enhancing masses or areas of ductal enhancement are seen in the left breast. \par Axilla and ancillary findings: There is no abnormal axillary or internal mammary adenopathy. A 12 month follow-up of the left breast is recommended. BI-RADS 3\par She denies any current breast concerns \par Up to date with MDs

## 2023-06-30 ENCOUNTER — NON-APPOINTMENT (OUTPATIENT)
Age: 43
End: 2023-06-30

## 2023-09-05 ENCOUNTER — NON-APPOINTMENT (OUTPATIENT)
Age: 43
End: 2023-09-05

## 2023-09-05 ENCOUNTER — APPOINTMENT (OUTPATIENT)
Dept: OPHTHALMOLOGY | Facility: CLINIC | Age: 43
End: 2023-09-05
Payer: COMMERCIAL

## 2023-09-05 PROCEDURE — 92014 COMPRE OPH EXAM EST PT 1/>: CPT

## 2023-09-05 PROCEDURE — 92133 CPTRZD OPH DX IMG PST SGM ON: CPT

## 2023-12-11 ENCOUNTER — APPOINTMENT (OUTPATIENT)
Dept: UROLOGY | Facility: CLINIC | Age: 43
End: 2023-12-11
Payer: COMMERCIAL

## 2023-12-11 VITALS
OXYGEN SATURATION: 100 % | DIASTOLIC BLOOD PRESSURE: 84 MMHG | RESPIRATION RATE: 16 BRPM | SYSTOLIC BLOOD PRESSURE: 121 MMHG | HEART RATE: 78 BPM

## 2023-12-11 DIAGNOSIS — N20.0 CALCULUS OF KIDNEY: ICD-10-CM

## 2023-12-11 DIAGNOSIS — N39.0 URINARY TRACT INFECTION, SITE NOT SPECIFIED: ICD-10-CM

## 2023-12-11 PROCEDURE — 99213 OFFICE O/P EST LOW 20 MIN: CPT

## 2023-12-11 RX ORDER — METHENAMINE HIPPURATE 1 G/1
1 TABLET ORAL
Qty: 180 | Refills: 3 | Status: ACTIVE | COMMUNITY
Start: 2020-01-13 | End: 1900-01-01

## 2023-12-14 PROBLEM — N20.0 KIDNEY STONE: Status: ACTIVE | Noted: 2020-01-30

## 2023-12-14 PROBLEM — N39.0 RECURRENT UTI: Status: ACTIVE | Noted: 2020-01-13

## 2023-12-14 NOTE — ASSESSMENT
[FreeTextEntry1] : Recurrent UTI and kidney stones doing well with Methenamine Hippurate 1 gm PO BID since 2020. Intermittent infection. May have a pelvic floor component as well. --Cont methenaine BID --avoid strain --avoid constipation --warm baths prn --pelvic floor stretches  ? hx of stones --US summer 2024

## 2023-12-14 NOTE — HISTORY OF PRESENT ILLNESS
[FreeTextEntry1] : 43 year old F with cc of recurrent UTI and stones. Pt reports that she has had issues since becoming sexually active with recurrent infection. Does correlate with intercourse usually. Sx during an infection include dysuria and increased frequency. Appears to be culture proven. No febrile infections. Had seen  in the past. Had been on Macrobid ppx in the past. She was started on methenamine. Had anatomic eval 1/2020 with negative cysto and renal US showed probable 3 mm LLP stone. Calcification on R appears intraparenchymal. Pt reports hx of stones in the past x1 in her early 20s. Drinks water and tea or coffee. No holding pattern. At baseline, no urinary complaints. No issues with constipation. No dyspareunia. She underwent US in July (with Dr Jimenez) that showed no changes. Unclear if RLP is stone vs calcification.   She returns today for follow-up. Last seen by me a year ago. Plan was made for continuation of methenamine indefinitely. SNo increased frequency or urgency. No true dysuria. Has had issues with dyspareunia in the past but not recently. No hx of TMJ. Doing much better with the methenamine. She is planning on moving more than 50% to [place]. Has been doing well.

## 2024-02-09 ENCOUNTER — NON-APPOINTMENT (OUTPATIENT)
Age: 44
End: 2024-02-09

## 2024-02-09 ENCOUNTER — APPOINTMENT (OUTPATIENT)
Dept: DERMATOLOGY | Facility: CLINIC | Age: 44
End: 2024-02-09
Payer: COMMERCIAL

## 2024-02-09 VITALS — HEIGHT: 61 IN | WEIGHT: 114 LBS | BODY MASS INDEX: 21.52 KG/M2

## 2024-02-09 DIAGNOSIS — L70.0 ACNE VULGARIS: ICD-10-CM

## 2024-02-09 PROCEDURE — 99203 OFFICE O/P NEW LOW 30 MIN: CPT

## 2024-02-09 RX ORDER — TRETINOIN 0.25 MG/G
0.03 CREAM TOPICAL
Qty: 1 | Refills: 3 | Status: ACTIVE | COMMUNITY
Start: 2024-02-09 | End: 1900-01-01

## 2024-02-09 RX ORDER — CEFPODOXIME PROXETIL 200 MG/1
200 TABLET, FILM COATED ORAL
Qty: 14 | Refills: 0 | Status: DISCONTINUED | COMMUNITY
Start: 2022-12-21 | End: 2024-02-09

## 2024-02-09 RX ORDER — PHENAZOPYRIDINE HYDROCHLORIDE 200 MG/1
200 TABLET ORAL 3 TIMES DAILY
Qty: 21 | Refills: 0 | Status: DISCONTINUED | COMMUNITY
Start: 2022-02-04 | End: 2024-02-09

## 2024-02-09 NOTE — ASSESSMENT
[FreeTextEntry1] : Acne:  Discontinue liquid matte foundation Recommended Bare Minerals or [place] powder foundation  Recommended Bare republic tinted mineral sunscreen   Start tretinoin 0.025% cream pea size amount TIW at night as tolerated  Discussed risks and benefits of topical retinoids, including skin irritation, dryness, peeling, burning, itching, discoloration and sun sensitivity.   Can continue: Sulfacetamide cleanser, azelaic acid gel qam and pimecrolimus cream as needed

## 2024-02-09 NOTE — PHYSICAL EXAM
[Alert] : alert [Oriented x 3] : ~L oriented x 3 [Well Nourished] : well nourished [FreeTextEntry3] : Erythematous papules and macules are present on the face.

## 2024-02-09 NOTE — HISTORY OF PRESENT ILLNESS
[FreeTextEntry1] : Rash [de-identified] : Patient c/o rash x 1-2 months. She states she gets acne-like breakouts on her face. It comes and goes. It is worse when she wears make-up (liquid foundation) and sunscreen. She was prescribed by another dermatologist sulfacetamide cleanser, azelaic acid gel and pimecrolimus cream.

## 2024-05-30 NOTE — ASSESSMENT
[FreeTextEntry1] : Stable nodule on left ultrasound Clinical breast exam normal  1. Annual bilateral mammogram and breast ultrasound due 10/2024. 2. Follow up office visit due in 1 year. 3. Advised monthly self breast examinations and advised her to contact me if she has any concerns. 4. Bilateral breast MRI due now (1 year follow up), Rx provided.

## 2024-05-30 NOTE — HISTORY OF PRESENT ILLNESS
[FreeTextEntry1] : Patient is a 43 year old female here today for a follow up visit.  She has a history of bilateral silicone breast augmentation, with the initial implant placement approximately 10 years ago with a subsequent implant exchange. No family history of breast cancer  SHARON breast cancer estimated risk 10.2% 9/17/2022 Bilateral mammogram: There are bilateral retropectoral silicone breast implants which appear mammographically unremarkable. No suspicious masses, grouped calcifications, or other findings are seen. As per the prior breast ultrasound report of 3/16/2022, the patient is currently due for a bilateral diagnostic breast ultrasound with attention to the left 2:00 axis, 9 cm from the nipple, at the site of a probably benign 0.3 cm mass.  An additional imaging exam of both breast(s) is recommended. BI-RADS 0. 10/21/2022 Bilateral breast ultrasound: Probably benign findings in the left breast at the 2:00 axis, 9 cm from the nipple (stable since 4/2/2021), and in the left breast at the 4:00 axis, 1 cm from the nipple measuring 3 x 2 x 2 mm, likely a benign cyst. A six-month follow-up left targeted breast ultrasound is recommended to confirm stability. No sonographic evidence of malignancy in the right breast. A 6 month follow-up targeted ultrasound of the left breast(s) is recommended. BI-RADS 3.  10/21/2022 Bilateral breast MRI (follow up for probably benign masses left breast on prior MRI):  Right breast- No suspicious enhancing masses or areas of ductal enhancement are seen in the right breast. Left breast- In the left upper outer breast, posterior depth, superior to the implant, there is a new 2.8 cm focal, clumped nonmass enhancement, at 1:00, 10 cm from the nipple. It is new since last MRI on 3/16/2022. It demonstrates type I kinetics on postcontrast imaging. Recommend a MRI guided core biopsy of the left breast. - Previously seen 9 mm superficial mass in the left upper inner breast, anterior depth has been stable since March 2021 MRI and can be considered benign. It is best seen on axial subtraction image 131. - Previously seen 8 mm mass in the left upper outer breast, middle depth, has been stable from 3/24/2021 MRI and can be considered benign. This is best seen on axial subtraction image 124. There is no abnormal axillary or internal mammary adenopathy. BI-RADS 4.  11/9/2022 Cancelled MRI guided core biopsy of left breast due to lack of safe access and high risk of implant rupture. Per the radiology report, she is 41 years old, with no known risk factors for breast cancer. Given the benign appearance of the lesion today, recommend a follow up breast MRI in 6 months to demonstrate stability. 3/28/2023 Left ultrasound: Hypoechoic mass versus clustered cysts with debris 2:00 9 cm from the nipple 3 x 3 x 2 mm, smaller than seen in 2021. Adjacent cysts 2:00 9 cm from the nipple 3 x 3 x 2 mm. Visualized portions of the implant appear intact. A 6 month follow-up ultrasound of the left breast(s) is recommended. BI-RADS 3  3/29/2023 Bilateral MRI: Right: No suspicious enhancing masses or areas of ductal enhancement are seen in the right breast. Left: Previously seen focal clumped nonmass enhancement in the left upper outer breast, posterior depth, at 1:00, 10 cm from the nipple appears less prominent than 10/21/2022 MRI. It appears similar to the background parenchymal enhancement and remains probably benign. It is best seen on axial subtraction image 136. Recommend continue MRI surveillance to demonstrate long-term stability. No suspicious enhancing masses or areas of ductal enhancement are seen in the left breast.  Axilla and ancillary findings: There is no abnormal axillary or internal mammary adenopathy. A 12 month follow-up of the left breast is recommended. BI-RADS 3. 10/30/2023 Bilateral mammogram: Benign. BI-RADS 2. Bilateral ultrasound: Scattered [id number] cysts in both breasts, ranging in size from 0.2 cm to 0.5 cm. A stable 0.3 x 0.2 x 0.3 cm oval circumscribed mixed echogenicity [id number] mass in left breast at 2:00 axis (9cm from nipple), stable compared to prior breast ultrasounds dating back to 4/2/2021, most compatible with benign etiology given demonstrated long-term stability. She denies any current breast concerns.

## 2024-05-30 NOTE — PHYSICAL EXAM

## 2024-05-30 NOTE — CONSULT LETTER
[Dear  ___] : Dear  [unfilled], [Courtesy Letter:] : I had the pleasure of seeing your patient, [unfilled], in my office today. [Please see my note below.] : Please see my note below. [Sincerely,] : Sincerely, [FreeTextEntry3] : Susan M. Palleschi, MD, FACS\par  Division of Breast Surgery\par  Director, Breast Surgery\par  [facility]\par  [address] [place] [address]\par  [address]\[place] [address]\par  (Phone) [phone]\par  (Fax) [phone]

## 2024-06-03 ENCOUNTER — APPOINTMENT (OUTPATIENT)
Dept: PLASTIC SURGERY | Facility: CLINIC | Age: 44
End: 2024-06-03
Payer: COMMERCIAL

## 2024-06-11 ENCOUNTER — NON-APPOINTMENT (OUTPATIENT)
Age: 44
End: 2024-06-11

## 2024-06-11 PROBLEM — Z12.39 BREAST SCREENING: Status: ACTIVE | Noted: 2024-06-11

## 2024-06-11 NOTE — HISTORY OF PRESENT ILLNESS
[FreeTextEntry1] : Patient is a 43 year old female here today for a follow up visit.  She has a history of bilateral silicone breast augmentation, with the initial implant placement approximately 10 years ago with a subsequent implant exchange. No family history of breast cancer  SHARON breast cancer estimated risk 10.2% 10/30/2023 Bilateral mammogram: Heterogeneously dense, which may obscure small masses. No suspicious masses, calcifications, or other findings are seen in either breast.   There are bilateral retropectoral silicone breast implants which appear mammographically unremarkable.  Bilateral ultrasound: Scattered benign-appearing cysts in both breasts, ranging in size from 0.2 cm to 0.5 cm. A stable 0.3 x 0.2 x 0.3 cm oval circumscribed mixed echogenicity benign-appearing mass in left breast at 2:00 axis (9cm from nipple), stable compared to prior breast ultrasounds dating back to 4/2/2021, most compatible with benign etiology given demonstrated long-term stability. BI-RADS 2 6/5/2024 Bilateral breast MRI: benign, implants intact. BI-RADS 2 She denies any current breast concerns.

## 2024-06-11 NOTE — ASSESSMENT
[FreeTextEntry1] : Stable nodule on left ultrasound Clinical breast exam normal  1. Annual bilateral mammogram and breast ultrasound due 10/2024. 2. Follow up office visit due in 1 year. 3. Advised monthly self breast examinations and advised her to contact me if she has any concerns. 4. Bilateral breast MRI with IV contrast due 6/2027 (implant evaluation)

## 2024-06-11 NOTE — PHYSICAL EXAM

## 2024-06-11 NOTE — CONSULT LETTER
[Dear  ___] : Dear  [unfilled], [Courtesy Letter:] : I had the pleasure of seeing your patient, [unfilled], in my office today. [Please see my note below.] : Please see my note below. [Sincerely,] : Sincerely, [FreeTextEntry3] : Yane Chapman, RPA-C Breast Surgery [address] [place] [address], [place] [address] (Phone) [phone] (Fax) [phone]

## 2024-06-12 ENCOUNTER — APPOINTMENT (OUTPATIENT)
Dept: PLASTIC SURGERY | Facility: CLINIC | Age: 44
End: 2024-06-12
Payer: COMMERCIAL

## 2024-06-12 DIAGNOSIS — Z12.39 ENCOUNTER FOR OTHER SCREENING FOR MALIGNANT NEOPLASM OF BREAST: ICD-10-CM

## 2024-06-25 ENCOUNTER — APPOINTMENT (OUTPATIENT)
Dept: PLASTIC SURGERY | Facility: CLINIC | Age: 44
End: 2024-06-25
Payer: COMMERCIAL

## 2024-06-25 VITALS
HEIGHT: 61 IN | SYSTOLIC BLOOD PRESSURE: 113 MMHG | HEART RATE: 93 BPM | BODY MASS INDEX: 21.52 KG/M2 | OXYGEN SATURATION: 99 % | DIASTOLIC BLOOD PRESSURE: 78 MMHG | TEMPERATURE: 98.3 F | WEIGHT: 114 LBS

## 2024-06-25 DIAGNOSIS — R92.8 OTHER ABNORMAL AND INCONCLUSIVE FINDINGS ON DIAGNOSTIC IMAGING OF BREAST: ICD-10-CM

## 2024-06-25 PROCEDURE — 99213 OFFICE O/P EST LOW 20 MIN: CPT

## 2024-06-25 NOTE — PHYSICAL EXAM
[Normocephalic] : normocephalic [Atraumatic] : atraumatic [EOMI] : extra ocular movement intact [Sclera nonicteric] : sclera nonicteric [Supple] : supple [No Supraclavicular Adenopathy] : no supraclavicular adenopathy [Examined in the supine and seated position] : examined in the supine and seated position [No dominant masses] : no dominant masses in right breast  [No dominant masses] : no dominant masses left breast [No Nipple Retraction] : no left nipple retraction [No Nipple Discharge] : no left nipple discharge [No Axillary Lymphadenopathy] : no left axillary lymphadenopathy [No Edema] : no edema [No Rashes] : no rashes [No Ulceration] : no ulceration [de-identified] : S/P bilateral augmentation with silicone implants.

## 2024-06-25 NOTE — HISTORY OF PRESENT ILLNESS
[FreeTextEntry1] : Patient is a 43 year old female here today for a follow up visit.  She has a history of bilateral silicone breast augmentation, with the initial implant placement approximately 10 years ago with a subsequent implant exchange. No family history of breast cancer  SHARON breast cancer estimated risk 10.2% 10/30/2023 Bilateral mammogram: Heterogeneously dense, which may obscure small masses. No suspicious masses, calcifications, or other findings are seen in either breast.   There are bilateral retropectoral silicone breast implants which appear mammographically unremarkable.  Bilateral ultrasound: Scattered benign-appearing cysts in both breasts, ranging in size from 0.2 cm to 0.5 cm. A stable 0.3 x 0.2 x 0.3 cm oval circumscribed mixed echogenicity benign-appearing mass in left breast at 2:00 axis (9cm from nipple), stable compared to prior breast ultrasounds dating back to 4/2/2021, most compatible with benign etiology given demonstrated long-term stability. BI-RADS 2 6/5/2024 Bilateral breast MRI: benign, implants intact. BI-RADS 2 She denies any current breast concerns. Up to date with MDs

## 2024-06-25 NOTE — ASSESSMENT
[FreeTextEntry1] : Stable nodule on left ultrasound Clinical breast exam normal  1. Annual bilateral mammogram and breast ultrasound due 10/2024, Rx provided. 2. Follow up office visit due in 1 year. 3. Advised monthly self breast examinations and advised her to contact me if she has any concerns. 4. Bilateral breast MRI with IV contrast due 6/2027 (implant evaluation).

## 2024-12-19 ENCOUNTER — APPOINTMENT (OUTPATIENT)
Dept: UROLOGY | Facility: CLINIC | Age: 44
End: 2024-12-19
Payer: COMMERCIAL

## 2024-12-19 VITALS
HEIGHT: 61 IN | WEIGHT: 110 LBS | DIASTOLIC BLOOD PRESSURE: 89 MMHG | SYSTOLIC BLOOD PRESSURE: 126 MMHG | HEART RATE: 70 BPM | BODY MASS INDEX: 20.77 KG/M2 | RESPIRATION RATE: 17 BRPM | OXYGEN SATURATION: 100 %

## 2024-12-19 DIAGNOSIS — N39.0 URINARY TRACT INFECTION, SITE NOT SPECIFIED: ICD-10-CM

## 2024-12-19 DIAGNOSIS — N20.0 CALCULUS OF KIDNEY: ICD-10-CM

## 2024-12-19 PROCEDURE — 99214 OFFICE O/P EST MOD 30 MIN: CPT

## 2024-12-19 RX ORDER — GALCANEZUMAB 100 MG/ML
INJECTION, SOLUTION SUBCUTANEOUS
Refills: 0 | Status: ACTIVE | COMMUNITY

## 2024-12-19 RX ORDER — ESCITALOPRAM OXALATE 20 MG/1
TABLET ORAL
Refills: 0 | Status: ACTIVE | COMMUNITY

## 2025-01-27 ENCOUNTER — APPOINTMENT (OUTPATIENT)
Dept: ULTRASOUND IMAGING | Facility: CLINIC | Age: 45
End: 2025-01-27

## 2025-06-05 ENCOUNTER — NON-APPOINTMENT (OUTPATIENT)
Age: 45
End: 2025-06-05

## 2025-06-17 ENCOUNTER — APPOINTMENT (OUTPATIENT)
Dept: PLASTIC SURGERY | Facility: CLINIC | Age: 45
End: 2025-06-17